# Patient Record
Sex: MALE | Race: WHITE | NOT HISPANIC OR LATINO | Employment: FULL TIME | URBAN - METROPOLITAN AREA
[De-identification: names, ages, dates, MRNs, and addresses within clinical notes are randomized per-mention and may not be internally consistent; named-entity substitution may affect disease eponyms.]

---

## 2019-02-16 ENCOUNTER — HOSPITAL ENCOUNTER (EMERGENCY)
Facility: CLINIC | Age: 19
Discharge: HOME | End: 2019-02-16
Admitting: PHYSICIAN ASSISTANT
Payer: COMMERCIAL

## 2019-02-16 VITALS
TEMPERATURE: 97.8 F | SYSTOLIC BLOOD PRESSURE: 127 MMHG | RESPIRATION RATE: 18 BRPM | BODY MASS INDEX: 24.38 KG/M2 | DIASTOLIC BLOOD PRESSURE: 78 MMHG | HEART RATE: 75 BPM | HEIGHT: 72 IN | WEIGHT: 180 LBS

## 2019-02-16 DIAGNOSIS — H10.32 ACUTE CONJUNCTIVITIS OF LEFT EYE, UNSPECIFIED ACUTE CONJUNCTIVITIS TYPE: ICD-10-CM

## 2019-02-16 DIAGNOSIS — H61.23 BILATERAL IMPACTED CERUMEN: ICD-10-CM

## 2019-02-16 DIAGNOSIS — H92.02 LEFT EAR PAIN: ICD-10-CM

## 2019-02-16 PROCEDURE — 99282 EMERGENCY DEPT VISIT SF MDM: CPT

## 2019-02-16 RX ORDER — POLYMYXIN B SULFATE AND TRIMETHOPRIM 1; 10000 MG/ML; [USP'U]/ML
1-2 SOLUTION OPHTHALMIC
Qty: 6 ML | Refills: 0 | Status: SHIPPED | OUTPATIENT
Start: 2019-02-16 | End: 2019-02-23

## 2019-02-16 ASSESSMENT — ENCOUNTER SYMPTOMS
EYE PAIN: 1
COUGH: 0
SORE THROAT: 0
EYE DISCHARGE: 1
HEADACHES: 0
EYE REDNESS: 1
RHINORRHEA: 0
FEVER: 0

## 2019-02-16 ASSESSMENT — MIFFLIN-ST. JEOR: SCORE: 1864.47

## 2019-02-16 NOTE — ED AVS SNAPSHOT
Emergency Department  [address] [place] [id number]  Phone:  [phone]  Fax:  [phone]                                    Guy Nicole   MRN: [id number]    Department:   Emergency Department   Date of Visit:  2/16/2019           After Visit Summary Signature Page    I have received my discharge instructions, and my questions have been answered. I have discussed any challenges I see with this plan with the nurse or doctor.    ..........................................................................................................................................  Patient/Patient Representative Signature      ..........................................................................................................................................  Patient Representative Print Name and Relationship to Patient    ..................................................               ................................................  Date                                   Time    ..........................................................................................................................................  Reviewed by Signature/Title    ...................................................              ..............................................  Date                                               Time          22EPIC Rev 08/18

## 2019-02-16 NOTE — ED PROVIDER NOTES
"  History     Chief Complaint:  Cold Symptoms    HPI   Guy Nicole is a 18 year old otherwise healthy male, does not wear contacts, with history of eczema, who presents with significant other for evaluation of cold symptoms, including left eye and ear pain for the last five days. Patient reports that he has been experiencing left eye pain, with noted drainage and redness. He notes that when he wakes up \"it feels like its shut\" and has been trying to irrigate it with water and using eye drops with minimal relief. He also reports some blurry vision, but notes it is not painful if he does not touch his eye. Patient also noted left ear pain as well, approximately two days after onset of left eye pain. Patient denies any fever, cough, sore throat, runny nose, headache or other physical complaints. Patient denies any known ill contacts.     Allergies:  No Known Drug Allergies      Medications:    The patient is not currently taking any prescribed medications.     Past Medical History:    The patient denies any relevant past medical history.    Past Surgical History:    History reviewed. No pertinent past surgical history.     Family History:    The patient denies any relevant family medical history.      Social History:  The patient was accompanied to the ED by significant other.  Smoking Status: N/A  Smokeless Tobacco: N/A  Alcohol Use: N/A  Drug Use: N/A   Marital Status:       Review of Systems   Constitutional: Negative for fever.   HENT: Positive for ear pain. Negative for rhinorrhea and sore throat.    Eyes: Positive for pain, discharge, redness and visual disturbance.   Respiratory: Negative for cough.    Neurological: Negative for headaches.   All other systems reviewed and are negative.    Physical Exam   Vitals:  Patient Vitals for the past 24 hrs:   BP Temp Temp src Pulse Resp Height Weight   02/16/19 1708 -- -- -- -- 18 -- --   02/16/19 1620 127/78 97.8  F (36.6  C) Oral 75 18 1.829 m (6') 81.6 kg " (180 lb)     Physical Exam  General: Well appearing, well nourished. Normal mood and affect.  Skin: Mildly erythematous dry, scaly rash throughout back of neck, face, upper extremities. Appears mildly pruritic.   HEENT: Head: Normocephalic, atraumatic, no visible masses.   Eyes: Right conjunctiva injected.  No significant edema to the upper or lower eyelid.  Mild matting in upper eyelashes with discharge.  Extraocular movements intact bilaterally. PERRL.  No periorbital edema or erythema.  Ears: Cerumen impacted in bilateral external auditory canals.  Unable to visualize TMs.  Reactive preauricular lymph node.  Nose: No external lesions, mucosa non-inflamed, septum and turbinates normal.   Throat/pharynx: Mucous membranes moist, no mucosal lesions.   Neck: Supple, without lymphadenopathy.  Cardiac: Normal rate and regular rhythm, no murmur or gallop.   Lungs: Clear to auscultation.  Abdomen: Abdomen soft, non-tender. No rebound tenderness of guarding.   Musculoskeletal: Normal gait and station.   Neurologic: Oriented x 3. GCS: 15.  Psychiatric: Intact recent and remote memory, judgment and insight, normal mood and affect.      Emergency Department Course     Emergency Department Course:  Nursing notes and vitals reviewed.    4:22 PM: I performed an exam of the patient as documented above. History obtained from patient.   4:49 PM: ER Tech reports patient's visual acuity is 20/20 bilaterally. Will discharge patient with below eye drops.  4:58 PM: Rechecked patient who endorsed significant improvement after left ear cerumen impaction. Will disimpact right ear as well and patient will be discharged home.    I discussed the treatment plan with the patient. They expressed understanding of this plan and consented to discharge. They will be discharged home with instructions for care and follow up. In addition, the patient will return to the emergency department if their symptoms persist, worsen, if new symptoms arise or if  there is any concern.  All questions were answered prior to discharge.    Impression & Plan      Medical Decision Making:  Guy Nicole is a 18 year old male who presents for evaluation of a red eye and left ear pain.  A broad differential diagnosis was considered including bacterial conjunctivitis, viral conjunctivitis, foreign body, corneal abrasion, chemical vs allergic conjunctivitis, corneal ulcer, HSV, herpes zoster opthalmicus, endopthalmitis, orbital cellulitis, etc.  Signs and symptoms consistent with a conjunctivitis, likely bacterial. Will start antibiotics and have close follow-up of eye physician.  No red flag symptoms to suggest any of the above worrisome etiologies. Visual acuity intact.  In regard to the patient's ear discomfort, there was a reactive lymph node in the preauricular region, left-sided.  Additionally, there was fairly significant cerumen impaction in bilateral external auditory canals.  This was irrigated here in the ED.  The patient then experienced significant improvement in his discomfort.  I was unable to visualize the TMs, which were within normal limits.  No signs of acute otitis media, no erythema or bulge.  I suspect that the impaction was causing the patient's discomfort.  I advised him to use cerumen softening drops at home to help avoid this in the future.  In regards to the patient's eczema, I suggested that he use thick emollients, such as Eucerin.  We also discussed possible dermatology referral.  Other symptomatic cares discussed.  Patient will return the ED for any changing worsening symptoms, worsening eye pain, visual changes, increased swelling, new concerns.  All questions were answered prior to patient's discharge.  He was in agreement with the treatment plan as stated above.    Diagnosis:    ICD-10-CM    1. Acute conjunctivitis of left eye, unspecified acute conjunctivitis type H10.32    2. Left ear pain H92.02    3. Bilateral impacted cerumen H61.23          Disposition:   Discharged.    Discharge Medications:     Medication List      Started    trimethoprim-polymyxin b 68898-3.1 UNIT/ML-% ophthalmic solution  Commonly known as:  POLYTRIM  1-2 drops, Left Eye, EVERY 3 HOURS          This was created at least in part with a voice recognition software. Mistakes/typos may be present.      Scribe Disclosure:  I, Eleonora Robertson, am serving as a scribe at 4:20 PM on 2/16/2019 to document services personally performed by Clara Hidalgo PA-C, based on my observations and the provider's statements to me.  2/16/2019    EMERGENCY DEPARTMENT       Clara Hidalgo PA  02/16/19 0424

## 2019-02-16 NOTE — DISCHARGE INSTRUCTIONS
"Use the antibacterial drops as prescribed for the next week.  Use Tylenol and ibuprofen to help with left ear pain.  Would consider following up with dermatology to help with eczema rash.  If rash is significantly itchy, consider using Benadryl.  Use thick emollients, hydrating lotion such as Eucerin or Aveeno.  Use these daily, especially after taking a bath to help with skin irritation.  Return to the ED for any changing worsening symptoms, new concerns.  Follow-up with your primary care provider within the next few days if not improving.  If you do not have a primary care provider 1 has been listed for you above.  Also  over-the-counter eardrops to help soften the wax in both of your ears.  He can use the suctioning device to help get this out of your ear canals.    Discharge Instructions  Conjunctivitis  Conjunctivitis, or \"pinkeye\", is inflammation of the conjunctiva which is the thin membrane that lines the inner surface of the eyelids and the whites of the eyes.   There are four main types of conjunctivitis: viral, bacterial, allergic, and non-specific. Both bacterial and viral conjunctivitis spread easily from one person to another by contact with the eye or another person?s hands, by an object the infected person has touched, such as a door handle, or by sharing an object that has touched their eye such as a towel or pillow case. Because of this, children with bacterial conjunctivitis can?t go back to school or  until they have been on antibiotic drops for 24 hours.  VIRAL CONJUNCTIVITIS:  This is typically caused by the virus that also causes the common cold and is often seen as part of a general cold.  This type of conjunctivitis is not treated with antibiotics, and usually lasts 3 - 5 days.  An over the counter antihistamine/decongestant eye drop may help to relieve the itching and irritation of viral conjunctivitis.  BACTERIAL CONJUNCTIVITIS:  This is treated with an antibiotic ointment or " eye drop.  In both bacterial and viral conjunctivitis, do not wear contact lenses until your eye is no longer red.   Your contact case should be thrown away and the contacts disinfected overnight, or replaced if disposable.  NON SPECIFIC CONJUNCTIVITIS: Sometimes a red eye is caused by other things such as dry eye, chemical exposure, or foreign body in the eye such as dust or eyelash.   All of these problems generally improve on their own within 24 hours.  ALLERGIC CONJUNCTIVITIS: These are eye symptoms caused by allergies. This type of conjunctivitis will be treated with allergy medications.    Return to the Emergency Department if:  If you have blurry vision.  If you have increasing eye pain or drainage.  If you have redness or swelling in the skin around the eye.  If you have a fever.    Follow-up with your doctor:    Your eye should improve within 2 days, if it does not, return to the Emergency Department or see your regular doctor for a second eye exam.  If you were given a prescription for medicine here today, be sure to read all of the information (including the package insert) that comes with your prescription.  This will include important information about the medicine, its side effects, and any warnings that you need to know about.  The pharmacist who fills the prescription can provide more information and answer questions you may have about the medicine.  If you have questions or concerns that the pharmacist cannot address, please call or return to the Emergency Department.       Opioid Medication Information    Pain medications are among the most commonly prescribed medicines, so we are including this information for all our patients. If you did not receive pain medication or get a prescription for pain medicine, you can ignore it.     You may have been given a prescription for an opioid (narcotic) pain medicine and/or have received a pain medicine while here in the Emergency Department. These medicines  can make you drowsy or impaired. You must not drive, operate dangerous equipment, or engage in any other dangerous activities while taking these medications. If you drive while taking these medications, you could be arrested for DUI, or driving under the influence. Do not drink any alcohol while you are taking these medications.     Opioid pain medications can cause addiction. If you have a history of chemical dependency of any type, you are at a higher risk of becoming addicted to pain medications.  Only take these prescribed medications to treat your pain when all other options have been tried. Take it for as short a time and as few doses as possible. Store your pain pills in a secure place, as they are frequently stolen and provide a dangerous opportunity for children or visitors in your house to start abusing these powerful medications. We will not replace any lost or stolen medicine.  As soon as your pain is better, you should flush all your remaining medication.     Many prescription pain medications contain Tylenol  (acetaminophen), including Vicodin , Tylenol #3 , Norco , Lortab , and Percocet .  You should not take any extra pills of Tylenol  if you are using these prescription medications or you can get very sick.  Do not ever take more than 3000 mg of acetaminophen in any 24 hour period.    All opioids tend to cause constipation. Drink plenty of water and eat foods that have a lot of fiber, such as fruits, vegetables, prune juice, apple juice and high fiber cereal.  Take a laxative if you don?t move your bowels at least every other day. Miralax , Milk of Magnesia, Colace , or Senna  can be used to keep you regular.      Remember that you can always come back to the Emergency Department if you are not able to see your regular doctor in the amount of time listed above, if you get any new symptoms, or if there is anything that worries you.

## 2020-11-09 ENCOUNTER — APPOINTMENT (OUTPATIENT)
Dept: CT IMAGING | Facility: CLINIC | Age: 20
End: 2020-11-09
Attending: EMERGENCY MEDICINE
Payer: COMMERCIAL

## 2020-11-09 ENCOUNTER — HOSPITAL ENCOUNTER (EMERGENCY)
Facility: CLINIC | Age: 20
Discharge: HOME | End: 2020-11-10
Attending: EMERGENCY MEDICINE
Payer: COMMERCIAL

## 2020-11-09 DIAGNOSIS — R09.1 PLEURISY: ICD-10-CM

## 2020-11-09 DIAGNOSIS — U07.1 CLINICAL DIAGNOSIS OF COVID-19: ICD-10-CM

## 2020-11-09 LAB
ALBUMIN SERPL-MCNC: 3.3 G/DL (ref 3.4–5)
ALP SERPL-CCNC: 64 U/L (ref 40–150)
ALT SERPL W P-5'-P-CCNC: 21 U/L (ref 0–70)
ANION GAP SERPL CALCULATED.3IONS-SCNC: 6 MMOL/L (ref 3–14)
AST SERPL W P-5'-P-CCNC: 21 U/L (ref 0–45)
BASOPHILS # BLD AUTO: 0 10E9/L (ref 0–0.2)
BASOPHILS NFR BLD AUTO: 0.4 %
BILIRUB SERPL-MCNC: 0.3 MG/DL (ref 0.2–1.3)
BUN SERPL-MCNC: 13 MG/DL (ref 7–30)
CALCIUM SERPL-MCNC: 8.3 MG/DL (ref 8.5–10.1)
CHLORIDE SERPL-SCNC: 105 MMOL/L (ref 94–109)
CO2 SERPL-SCNC: 30 MMOL/L (ref 20–32)
CREAT SERPL-MCNC: 1.04 MG/DL (ref 0.66–1.25)
D DIMER PPP FEU-MCNC: 0.7 UG/ML FEU (ref 0–0.5)
DIFFERENTIAL METHOD BLD: ABNORMAL
EOSINOPHIL # BLD AUTO: 0.2 10E9/L (ref 0–0.7)
EOSINOPHIL NFR BLD AUTO: 3.4 %
ERYTHROCYTE [DISTWIDTH] IN BLOOD BY AUTOMATED COUNT: 12.9 % (ref 10–15)
GFR SERPL CREATININE-BSD FRML MDRD: >90 ML/MIN/{1.73_M2}
GLUCOSE SERPL-MCNC: 92 MG/DL (ref 70–99)
HCT VFR BLD AUTO: 45.2 % (ref 40–53)
HGB BLD-MCNC: 13.5 G/DL (ref 13.3–17.7)
IMM GRANULOCYTES # BLD: 0 10E9/L (ref 0–0.4)
IMM GRANULOCYTES NFR BLD: 0.2 %
LYMPHOCYTES # BLD AUTO: 1.3 10E9/L (ref 0.8–5.3)
LYMPHOCYTES NFR BLD AUTO: 28 %
MCH RBC QN AUTO: 26.3 PG (ref 26.5–33)
MCHC RBC AUTO-ENTMCNC: 29.9 G/DL (ref 31.5–36.5)
MCV RBC AUTO: 88 FL (ref 78–100)
MONOCYTES # BLD AUTO: 0.7 10E9/L (ref 0–1.3)
MONOCYTES NFR BLD AUTO: 14.6 %
NEUTROPHILS # BLD AUTO: 2.4 10E9/L (ref 1.6–8.3)
NEUTROPHILS NFR BLD AUTO: 53.4 %
NRBC # BLD AUTO: 0 10*3/UL
NRBC BLD AUTO-RTO: 0 /100
PLATELET # BLD AUTO: 181 10E9/L (ref 150–450)
POTASSIUM SERPL-SCNC: 3.8 MMOL/L (ref 3.4–5.3)
PROT SERPL-MCNC: 7.1 G/DL (ref 6.8–8.8)
RBC # BLD AUTO: 5.14 10E12/L (ref 4.4–5.9)
SODIUM SERPL-SCNC: 141 MMOL/L (ref 133–144)
TROPONIN I SERPL-MCNC: <0.015 UG/L (ref 0–0.04)
WBC # BLD AUTO: 4.5 10E9/L (ref 4–11)

## 2020-11-09 PROCEDURE — 99285 EMERGENCY DEPT VISIT HI MDM: CPT | Mod: 25

## 2020-11-09 PROCEDURE — 85025 COMPLETE CBC W/AUTO DIFF WBC: CPT | Performed by: EMERGENCY MEDICINE

## 2020-11-09 PROCEDURE — 250N000013 HC RX MED GY IP 250 OP 250 PS 637: Performed by: EMERGENCY MEDICINE

## 2020-11-09 PROCEDURE — 250N000011 HC RX IP 250 OP 636: Performed by: EMERGENCY MEDICINE

## 2020-11-09 PROCEDURE — 71275 CT ANGIOGRAPHY CHEST: CPT

## 2020-11-09 PROCEDURE — 85379 FIBRIN DEGRADATION QUANT: CPT | Performed by: EMERGENCY MEDICINE

## 2020-11-09 PROCEDURE — 84484 ASSAY OF TROPONIN QUANT: CPT | Performed by: EMERGENCY MEDICINE

## 2020-11-09 PROCEDURE — 93005 ELECTROCARDIOGRAM TRACING: CPT

## 2020-11-09 PROCEDURE — 80053 COMPREHEN METABOLIC PANEL: CPT | Performed by: EMERGENCY MEDICINE

## 2020-11-09 RX ORDER — ASPIRIN 81 MG/1
324 TABLET, CHEWABLE ORAL ONCE
Status: COMPLETED | OUTPATIENT
Start: 2020-11-09 | End: 2020-11-09

## 2020-11-09 RX ADMIN — ASPIRIN 324 MG: 81 TABLET, CHEWABLE ORAL at 23:00

## 2020-11-09 ASSESSMENT — ENCOUNTER SYMPTOMS
SORE THROAT: 1
DIARRHEA: 1
FEVER: 1
COUGH: 1
VOMITING: 0
NAUSEA: 0
HEADACHES: 1
SHORTNESS OF BREATH: 1
PALPITATIONS: 1

## 2020-11-09 NOTE — ED AVS SNAPSHOT
[facility] Emergency Dept  [address]  [facility] [address]  Phone: [phone]  Fax: [phone]                                    Guy Nicole   MRN: [id number]    Department: [facility] Emergency Dept   Date of Visit: 11/9/2020           After Visit Summary Signature Page    I have received my discharge instructions, and my questions have been answered. I have discussed any challenges I see with this plan with the nurse or doctor.    ..........................................................................................................................................  Patient/Patient Representative Signature      ..........................................................................................................................................  Patient Representative Print Name and Relationship to Patient    ..................................................               ................................................  Date                                   Time    ..........................................................................................................................................  Reviewed by Signature/Title    ...................................................              ..............................................  Date                                               Time          22EPIC Rev 08/18

## 2020-11-09 NOTE — LETTER
November 10, 2020      To Whom It May Concern:      Guy Nicole was seen in our Emergency Department today, 11/10/20.  I expect his condition to improve over the next 3 days.  He may return to work when improved.    Sincerely,        Justin Francisco RN

## 2020-11-10 VITALS
TEMPERATURE: 98.5 F | RESPIRATION RATE: 13 BRPM | HEART RATE: 90 BPM | SYSTOLIC BLOOD PRESSURE: 120 MMHG | OXYGEN SATURATION: 99 % | BODY MASS INDEX: 24.41 KG/M2 | DIASTOLIC BLOOD PRESSURE: 78 MMHG | WEIGHT: 180 LBS

## 2020-11-10 LAB — INTERPRETATION ECG - MUSE: NORMAL

## 2020-11-10 PROCEDURE — 250N000011 HC RX IP 250 OP 636: Performed by: EMERGENCY MEDICINE

## 2020-11-10 RX ORDER — IOPAMIDOL 755 MG/ML
500 INJECTION, SOLUTION INTRAVASCULAR ONCE
Status: COMPLETED | OUTPATIENT
Start: 2020-11-10 | End: 2020-11-10

## 2020-11-10 RX ORDER — IBUPROFEN 600 MG/1
600 TABLET, FILM COATED ORAL ONCE
Status: DISCONTINUED | OUTPATIENT
Start: 2020-11-10 | End: 2020-11-10 | Stop reason: HOSPADM

## 2020-11-10 RX ADMIN — IOPAMIDOL 63 ML: 755 INJECTION, SOLUTION INTRAVENOUS at 00:11

## 2020-11-10 NOTE — ED PROVIDER NOTES
History     Chief Complaint:  Covid Concern and Chest Pain      HPI  Guy Nicole is a 20 year old male who presents to the emergency department for evaluation of COVID concern and chest pain. Patient was tested positive for COVID two days ago. He states he had symptoms including cough, fever, shortness of breath, sore throat, diarrhea, rash, and headaches. He states the past 1-2 days he has had intermittent chest pain and palpitations that have been progressively worsening. He presented to Urgent Care who immediately sent the patient here with concerns for possible PE. No nausea or vomiting.     Allergies:  No known drug allergies    Medications:    The patient is not currently taking any prescribed medications.    Past Medical History:    COVID-19  Eczema    Past Surgical History:    Tonsillectomy    Family History:    Cancer    Social History:  Smoking Status: Never Smoker  The patient presents to the emergency department by himself.  Marital Status: Single    Review of Systems   Constitutional: Positive for fever.   HENT: Positive for sore throat.    Respiratory: Positive for cough and shortness of breath.    Cardiovascular: Positive for chest pain and palpitations.   Gastrointestinal: Positive for diarrhea. Negative for nausea and vomiting.   Skin: Positive for rash.   Neurological: Positive for headaches.   All other systems reviewed and are negative.    Physical Exam     Patient Vitals for the past 24 hrs:   BP Temp Temp src Pulse Resp SpO2 Weight   11/10/20 0000 -- -- -- 85 8 98 % --   11/09/20 2345 -- -- -- 90 24 99 % --   11/09/20 2330 -- -- -- 87 11 99 % --   11/09/20 2030 128/82 98.5  F (36.9  C) Oral 93 20 100 % 81.6 kg (180 lb)         Physical Exam  Constitutional:  Oriented to person, place, and time.   HENT:   Head:    Normocephalic.   Mouth/Throat:   Oropharynx is clear and moist.   Eyes:    EOM are normal. Pupils are equal, round, and reactive to light.   Neck:    Neck supple.    Cardiovascular:  Normal rate, regular rhythm and normal heart sounds.      Exam reveals no gallop and no friction rub.       No murmur heard.  Pulmonary/Chest:  Effort normal and breath sounds normal.      No respiratory distress. No wheezes. No rales.      No reproducible chest wall pain.  Abdominal:   Soft. No distension. No tenderness. No rebound and no guarding.   Musculoskeletal:  Normal range of motion.   Neurological:   Alert and oriented to person, place, and time.           Moves all 4 extremities spontaneously    Skin:    No rash noted. No pallor.       Emergency Department Course   EKG  Indication: Chest Pain  Time: 20:42:47  Rate 82 bpm. NY interval 148. QRS duration 102. QT/QTc 364/425. P-R-T axes 38 80 48  Normal sinus rhythm. Normal ECG.   Interpreted at 2044 by Felipe Hart MD.    Imaging:  Radiographic findings were communicated with the patient who voiced understanding of the findings.  CT Chest Pulmonary embolism w/ contrast   IMPRESSION:  1.  There is no pulmonary embolus, aortic aneurysm or dissection.  2.  Multifocal bilateral pneumonia consistent with COVID-19 pneumonia.  3.  A few mildly enlarged bilateral axillary lymph nodes.   as per radiology.     Laboratory:  CBC: WBC: 4.5, HGB: 13.5, PLT: 181  CMP: Glucose 92, Calcium: 8.3 (L), Albumin: 3.3 (L), o/w WNL (Creatinine: 1.04)  Troponin (2300): <0.015  D-dimer: 0.7 (H)    Interventions:  2300 aspirin 324 mg Oral  109 ibuprofen 600 mg Oral    Emergency Department Course:  Nursing notes and vitals reviewed. (2233) I performed an exam of the patient as documented above.     Medicine administered as documented above. Blood drawn. This was sent to the lab for further testing, results above.     The patient was sent for a CT while in the emergency department, findings above.     An EKG was recorded. Results as noted above.     0040 I rechecked the patient and discussed the results of his workup thus far.     Findings and plan explained to the  Patient. Patient discharged home with instructions regarding supportive care, medications, and reasons to return. The importance of close follow-up was reviewed.       Impression & Plan    Medical Decision Making:  Guy Nicole is a 20 year old male, who was sent from Urgent Care for further workup, known COVID positive, who comes in complaining of pleuritic chest pain. Differential includes pleurisy, COVID-19, pneumothorax, pneumonia, or other causes. Workup thus far shows an elevated d-dimer, therefore I did obtain a CT of his chest which is negative for PE. It is consistent with COVID-19 pneumonia. This is clearly not bacterial and does not require antibiotics and his symptoms are likely due to pleurisy. He was told to take Tylenol and ibuprofen for pain. He is not hypoxic here and he does not require admission and is otherwise appropriate for outpatient management. He was told to return for any worsening shortness of breath, worsening symptoms, or concerns.    Critical Care time:  none    Covid-19  Guy Nicole was evaluated during a global COVID-19 pandemic, which necessitated consideration that the patient might be at risk for infection with the SARS-CoV-2 virus that causes COVID-19.   Applicable protocols for evaluation were followed during the patient's care.   COVID-19 was considered as part of the patient's evaluation. The plan for testing is:  a test was obtained at a previous visit and reviewed & considered today.      Diagnosis:    ICD-10-CM    1. Clinical diagnosis of COVID-19  U07.1    2. Pleurisy  R09.1        Disposition:  Discharged to home    Freedom Welsh  11/9/2020   EMERGENCY DEPARTMENT  Scribe Disclosure:  I, Freedom Welsh, am serving as a scribe at 10:33 PM on 11/9/2020 to document services personally performed by Felipe Hart MD based on my observations and the provider's statements to me.          Felipe Hart MD  11/10/20 1782

## 2020-11-10 NOTE — ED TRIAGE NOTES
Pt arrives with complaints of increasing chest pain in the presence of COVID, which he tested positive for on Saturday. Pt reports he has every COVID Sx besides nausea and vomiting, including rash, SOB, cough, fevers, and headaches. ABCs intact, A/O x4. Sent from clinic, where they did nothing, for d-dimer.

## 2021-11-01 ENCOUNTER — OFFICE VISIT (OUTPATIENT)
Dept: URGENT CARE | Facility: URGENT CARE | Age: 21
End: 2021-11-01
Payer: COMMERCIAL

## 2021-11-01 VITALS
BODY MASS INDEX: 24.14 KG/M2 | WEIGHT: 178 LBS | SYSTOLIC BLOOD PRESSURE: 158 MMHG | OXYGEN SATURATION: 100 % | HEART RATE: 115 BPM | RESPIRATION RATE: 16 BRPM | DIASTOLIC BLOOD PRESSURE: 60 MMHG | TEMPERATURE: 98.5 F

## 2021-11-01 DIAGNOSIS — R21 PENILE RASH: Primary | ICD-10-CM

## 2021-11-01 DIAGNOSIS — Z86.19 HISTORY OF POSITIVE PCR FOR HERPES SIMPLEX VIRUS TYPE 2 (HSV-2) DNA: ICD-10-CM

## 2021-11-01 PROCEDURE — 99203 OFFICE O/P NEW LOW 30 MIN: CPT | Performed by: FAMILY MEDICINE

## 2021-11-01 RX ORDER — SWAB
SWAB, NON-MEDICATED MISCELLANEOUS
COMMUNITY
Start: 2021-02-26

## 2021-11-01 RX ORDER — FLUTICASONE PROPIONATE 50 MCG
2 SPRAY, SUSPENSION (ML) NASAL
COMMUNITY
Start: 2021-07-19 | End: 2022-01-14

## 2021-11-01 RX ORDER — VALACYCLOVIR HYDROCHLORIDE 500 MG/1
500 TABLET, FILM COATED ORAL 2 TIMES DAILY
Qty: 6 TABLET | Refills: 0 | Status: SHIPPED | OUTPATIENT
Start: 2021-11-01 | End: 2022-09-19

## 2021-11-01 RX ORDER — PREDNISONE 20 MG/1
20 TABLET ORAL DAILY
COMMUNITY
Start: 2021-07-19 | End: 2022-01-14

## 2021-11-01 RX ORDER — TRIAMCINOLONE ACETONIDE 1 MG/G
CREAM TOPICAL
COMMUNITY
Start: 2021-02-26 | End: 2022-01-14

## 2021-11-01 RX ORDER — HYDROXYZINE HYDROCHLORIDE 50 MG/1
TABLET, FILM COATED ORAL
COMMUNITY
Start: 2021-02-26 | End: 2022-01-14

## 2021-11-01 NOTE — PATIENT INSTRUCTIONS
Patient Education     Genital Herpes    Genital herpes is a common sexually transmitted infection (STI). It is caused by the herpes simplex virus (HSV). One out of 5 teens and adults carry the herpes virus. During an outbreak, it causes small blisters that break open, leaving small, painful sores in the genital area. Eventually, scabs form and the sores heal. In women, these show up most often on the skin just outside the vaginal opening. They can occur on the buttocks, anus, or cervix. In men, the sores are usually on the tip, sides, or base of the penis. They also occur on the scrotum, buttocks, or thighs.  The first outbreak begins within 2 to 3 weeks after exposure to an infected sexual partner. It may last 1 to 3 weeks. It may cause headache, muscle ache, and fevers. The first outbreak is usually the worst. Because the virus remains in the body even after the sores heal, most people will have more outbreaks. The frequency of outbreaks is different for each person. Some people will never have another outbreak. Others will have several episodes a year. Later outbreaks are usually shorter, milder, and less painful. For many, the number of outbreaks tends to decrease over time. Various factors may trigger an outbreak. These include:    Emotional stress    Menstruation    Presence of another illness (cold, flu, or fever from any cause)    Overexertion and fatigue    Weak immune system  Home care    It is very important that you do not have sexual relations until all the herpes sores have healed completely.    Wash the affected area gently with mild soap and water. Wash your hands after touching the affected area.    You may use over-the-counter pain medicine unless another pain medicine was prescribed. If you have chronic liver or kidney disease or have ever had a stomach ulcer or gastrointestinal bleeding, talk with your healthcare provider before using these medicines. Also talk to your provider if you are  taking medicine to prevent blood clots. Aspirin should never be given to anyone younger than 18 years of age who is ill with a viral infection or fever. It may cause severe liver or brain damage.    Your healthcare provider may prescribe antiviral medicine during the first outbreak. This will help the sores heal faster. Antiviral medicine may also be prescribed so that you have it ready to take at the first sign of another outbreak. This will help the symptoms go away sooner. For people with frequent outbreaks, daily preventive therapy may be prescribed. This will help reduce the frequency of attacks. Daily preventive therapy may also reduce risk of spread of herpes to your sexual partner. Discuss the risks and benefits of daily therapy with your healthcare provider.    If you are a woman who is pregnant now or may become pregnant in the future, let your healthcare provider know that you have had herpes. This may affect the way your baby is delivered.  Preventing spread to others  The virus is spread by sexual contact with someone who has the herpes virus. The risk of spread is highest when the sores are present. However, there is a chance of spreading the virus even when sores are not visible. Inform future sexual partners that you have herpes and that they may become infected. To reduce the risk of passing the virus to a partner who has never had herpes, avoid sexual relations at the first sign of an outbreak and until the sores are fully healed. Latex barriers, such as condoms, reduce the risk of spread between outbreaks if the infected site is covered, but they do not guarantee protection.  Follow-up care  Follow up with your healthcare provider, or as advised.  People who have just learned that they have herpes may feel upset. Getting the facts about herpes can help you feel more in control. Follow up with your healthcare provider or the public health department for complete STI screening, including HIV  testing.  When to seek medical advice  Call your healthcare provider right away if any of these occur:    Inability to urinate due to pain    Swelling or increasing redness in the genital area    Discharge from the vagina or penis    Increasing back or abdominal pain    Rash or joint pain  Call 911  Call 911 or get immediate medical care if any of these occur:    Unusual drowsiness, weakness, or confusion    Worsening headache or stiff neck  Janae last reviewed this educational content on 6/1/2018 2000-2021 [employer]. All rights reserved. This information is not intended as a substitute for professional medical care. Always follow your healthcare professional's instructions.

## 2021-11-01 NOTE — PROGRESS NOTES
SUBJECTIVE: Guy Nicole is a 21 year old male presenting with a chief complaint of penile rash.  Onset of symptoms was day(s) ago.  Course of illness is same.    Current and Associated symptoms: none  Predisposing factors include HX of HSV type 2.    No past medical history on file.  No Known Allergies  Social History     Tobacco Use     Smoking status: Never Smoker     Smokeless tobacco: Never Used   Substance Use Topics     Alcohol use: Not on file       ROS:  SKIN: no rash  GI: no vomiting    OBJECTIVE:  BP (!) 158/60   Pulse 115   Temp 98.5  F (36.9  C) (Tympanic)   Resp 16   Wt 80.7 kg (178 lb)   SpO2 100%   BMI 24.14 kg/m  GENERAL APPEARANCE: healthy, alert and no distress  SKIN: penile blisters      ICD-10-CM    1. Penile rash  R21 valACYclovir (VALTREX) 500 MG tablet   2. History of positive PCR for herpes simplex virus type 2 (HSV-2) DNA  Z86.19        Fluids/Rest, f/u if worse/not any better

## 2022-01-14 ENCOUNTER — OFFICE VISIT (OUTPATIENT)
Dept: URGENT CARE | Facility: URGENT CARE | Age: 22
End: 2022-01-14

## 2022-01-14 VITALS
OXYGEN SATURATION: 100 % | TEMPERATURE: 97.4 F | DIASTOLIC BLOOD PRESSURE: 68 MMHG | SYSTOLIC BLOOD PRESSURE: 123 MMHG | HEART RATE: 68 BPM

## 2022-01-14 DIAGNOSIS — R21 RASH: ICD-10-CM

## 2022-01-14 DIAGNOSIS — L20.9 ATOPIC DERMATITIS, UNSPECIFIED TYPE: Primary | ICD-10-CM

## 2022-01-14 DIAGNOSIS — L29.9 PRURITIC DISORDER: ICD-10-CM

## 2022-01-14 PROCEDURE — 99214 OFFICE O/P EST MOD 30 MIN: CPT | Performed by: PHYSICIAN ASSISTANT

## 2022-01-14 RX ORDER — TRIAMCINOLONE ACETONIDE 5 MG/G
CREAM TOPICAL 2 TIMES DAILY
Qty: 60 G | Refills: 0 | Status: SHIPPED | OUTPATIENT
Start: 2022-01-14

## 2022-01-14 RX ORDER — PREDNISONE 20 MG/1
TABLET ORAL
Qty: 19 TABLET | Refills: 0 | Status: SHIPPED | OUTPATIENT
Start: 2022-01-14 | End: 2022-09-19

## 2022-01-14 RX ORDER — HYDROXYZINE HYDROCHLORIDE 25 MG/1
25-50 TABLET, FILM COATED ORAL
Qty: 60 TABLET | Refills: 1 | Status: SHIPPED | OUTPATIENT
Start: 2022-01-14

## 2022-01-14 RX ORDER — CETIRIZINE HYDROCHLORIDE 10 MG/1
10 TABLET ORAL DAILY
Qty: 90 TABLET | Refills: 0 | Status: SHIPPED | OUTPATIENT
Start: 2022-01-14

## 2022-01-14 NOTE — PROGRESS NOTES
Assessment & Plan     Atopic dermatitis, unspecified type  Severity of atopic dermatitis is mod/severe  Course of prednisone  Start on cerave cream  Use triamcinolone for worst areas, spot treatmentes  - predniSONE (DELTASONE) 20 MG tablet; 1 tab po tid for 3 days, then 1 tab po bid for 3 days, then 1 tab po every day for 2 days, then 1/2 tab po every day for 4 days  - Emollient (CERAVE) CREA; Externally apply topically daily  - triamcinolone (ARISTOCORT HP) 0.5 % external cream; Apply topically 2 times daily  - Adult Dermatology Referral; Future    Rash  Secondary to severe eczema  Referral to Dermatology  - predniSONE (DELTASONE) 20 MG tablet; 1 tab po tid for 3 days, then 1 tab po bid for 3 days, then 1 tab po every day for 2 days, then 1/2 tab po every day for 4 days  - hydrOXYzine (ATARAX) 25 MG tablet; Take 1-2 tablets (25-50 mg) by mouth nightly as needed for itching  - cetirizine (ZYRTEC) 10 MG tablet; Take 1 tablet (10 mg) by mouth daily  - Adult Dermatology Referral; Future    Pruritic disorder  Prednisone  Atarax for itching  Zyrtec for itching  - predniSONE (DELTASONE) 20 MG tablet; 1 tab po tid for 3 days, then 1 tab po bid for 3 days, then 1 tab po every day for 2 days, then 1/2 tab po every day for 4 days  - hydrOXYzine (ATARAX) 25 MG tablet; Take 1-2 tablets (25-50 mg) by mouth nightly as needed for itching  - cetirizine (ZYRTEC) 10 MG tablet; Take 1 tablet (10 mg) by mouth daily  - Adult Dermatology Referral; Future    Review of external notes as documented elsewhere in note  30 minutes spent on the date of the encounter doing chart review, review of outside records, review of test results, interpretation of tests, patient visit and documentation     CONSULTATION/REFERRAL to Dermatology    No follow-ups on file.    Júnior Padilla PA-C  [facility] NIKI Tovar is a 21 year old who presents for the following health issues  accompanied by his spouse.    HPI      Severe rash  Itching  Unable to sleep    Review of Systems   Constitutional, HEENT, cardiovascular, pulmonary, gi and gu systems are negative, except as otherwise noted.      Objective    /68   Pulse 68   Temp 97.4  F (36.3  C) (Tympanic)   SpO2 100%   There is no height or weight on file to calculate BMI.  Physical Exam   GENERAL: healthy, alert and no distress  MS: no gross musculoskeletal defects noted, no edema  SKIN: Positive for generalized itching, rash, eczema like rash, worst in areas   NEURO: Normal strength and tone, mentation intact and speech normal

## 2022-09-19 ENCOUNTER — HOSPITAL ENCOUNTER (EMERGENCY)
Facility: CLINIC | Age: 22
Discharge: HOME | End: 2022-09-19
Attending: EMERGENCY MEDICINE | Admitting: EMERGENCY MEDICINE
Payer: OTHER MISCELLANEOUS

## 2022-09-19 VITALS
DIASTOLIC BLOOD PRESSURE: 84 MMHG | HEART RATE: 70 BPM | SYSTOLIC BLOOD PRESSURE: 126 MMHG | RESPIRATION RATE: 20 BRPM | OXYGEN SATURATION: 100 % | TEMPERATURE: 98 F

## 2022-09-19 DIAGNOSIS — S61.212A LACERATION OF RIGHT MIDDLE FINGER WITHOUT FOREIGN BODY WITHOUT DAMAGE TO NAIL, INITIAL ENCOUNTER: ICD-10-CM

## 2022-09-19 PROCEDURE — 99282 EMERGENCY DEPT VISIT SF MDM: CPT

## 2022-09-20 NOTE — ED PROVIDER NOTES
Visit Date: 09/19/2022    CHIEF COMPLAINT:  Finger laceration.    HISTORY OF PRESENT ILLNESS:  This is a 22-year-old gentleman who presents with a laceration to his left third finger.  He sustained this at work.  He was cutting something and a strap hit his finger causing a bleeding wound that he has not been able to get to stop bleeding.  His last tetanus shot was 2 years ago.  No other complaints at this time.    PAST MEDICAL HISTORY:  Eczema.    PAST SURGICAL HISTORY:  Tonsillectomy.    MEDICATIONS:    1.  Zyrtec.  2.  Emollient cream.  3.  Atarax.  4.  Triamcinolone cream.  5.  Valtrex.  6.  Omega 3 fatty acids.    SOCIAL HISTORY:  The patient presents with a coworker.  He does not smoke.    REVIEW OF SYSTEMS:  Pertinent 10-point review of systems is negative except for that noted in the HPI.    PHYSICAL EXAMINATION:    GENERAL:  The patient is sitting up comfortably in a chair in triage.  VITAL SIGNS:  Blood pressure 126/84, heart rate 70, respiratory rate 20, oxygen 100% on room air, temperature 98 degrees.  CARDIOVASCULAR:  Normal perfusion in the left third finger.  RESPIRATORY:  Normal effort.  MUSCULOSKELETAL:  Full range of motion of the left second finger.  No deformity.  He is able to hold the finger at extension against resistance.  SKIN:  There is an avulsion laceration to the dorsum of the middle phalanx of the left third finger.  Loss of soft tissue noted.  Total length approximately 4 mm x 2 mm.  PSYCHIATRIC:  The patient has normal mood and affect.  NEUROLOGIC:  The patient is alert and has normal strength and sensation in the left third finger.    LABORATORY DATA AND DIAGNOSTIC STUDIES:  None.    EMERGENCY DEPARTMENT COURSE AND MEDICAL DECISION MAKING:  This is a 22-year-old gentleman who presents with an avulsion type laceration to the dorsum of his left third finger.  This is not amenable to suture repair.  It was cleaned thoroughly with Shur-Clens.  I then placed Surgicel on top to control the  bleeding, covered with sterile dressings and placed in a finger splint for protection.  He will follow up with his regular physician as needed.  Tetanus was confirmed to be up-to-date.    DIAGNOSES:  Avulsion laceration of the left third finger.    PLAN OF CARE:  As above.    Héctor Aguillon MD        D: 2022   T: 2022   MT: ANAYA    Name:     CONSTANZA NIELSON  MRN:      [id number]        Account:    [id number]   :      2000           Visit Date: 2022     Document: [id number]

## 2022-09-20 NOTE — ED TRIAGE NOTES
Pt arrives to ED with wound to L middle finger. Pt was cutting something at work and a strap hit his finger and caused a bleeding wound. Bleeding controlled in triage, no thinners.

## 2023-10-03 ENCOUNTER — OFFICE VISIT (OUTPATIENT)
Dept: FAMILY MEDICINE | Facility: CLINIC | Age: 23
End: 2023-10-03
Payer: COMMERCIAL

## 2023-10-03 VITALS
SYSTOLIC BLOOD PRESSURE: 135 MMHG | HEART RATE: 61 BPM | BODY MASS INDEX: 23.59 KG/M2 | DIASTOLIC BLOOD PRESSURE: 76 MMHG | OXYGEN SATURATION: 100 % | RESPIRATION RATE: 16 BRPM | WEIGHT: 178 LBS | HEIGHT: 73 IN | TEMPERATURE: 97.9 F

## 2023-10-03 DIAGNOSIS — Z11.4 SCREENING FOR HIV (HUMAN IMMUNODEFICIENCY VIRUS): ICD-10-CM

## 2023-10-03 DIAGNOSIS — B00.9 HSV (HERPES SIMPLEX VIRUS) INFECTION: ICD-10-CM

## 2023-10-03 DIAGNOSIS — Z00.00 ROUTINE GENERAL MEDICAL EXAMINATION AT A HEALTH CARE FACILITY: Primary | ICD-10-CM

## 2023-10-03 DIAGNOSIS — Z11.59 NEED FOR HEPATITIS C SCREENING TEST: ICD-10-CM

## 2023-10-03 DIAGNOSIS — Z13.228 SCREENING FOR METABOLIC DISORDER: ICD-10-CM

## 2023-10-03 LAB
HCV AB SERPL QL IA: NONREACTIVE
HIV 1+2 AB+HIV1 P24 AG SERPL QL IA: NONREACTIVE

## 2023-10-03 PROCEDURE — 36415 COLL VENOUS BLD VENIPUNCTURE: CPT | Performed by: FAMILY MEDICINE

## 2023-10-03 PROCEDURE — 86803 HEPATITIS C AB TEST: CPT | Performed by: FAMILY MEDICINE

## 2023-10-03 PROCEDURE — 99395 PREV VISIT EST AGE 18-39: CPT | Performed by: FAMILY MEDICINE

## 2023-10-03 PROCEDURE — 87389 HIV-1 AG W/HIV-1&-2 AB AG IA: CPT | Performed by: FAMILY MEDICINE

## 2023-10-03 PROCEDURE — 80048 BASIC METABOLIC PNL TOTAL CA: CPT | Performed by: FAMILY MEDICINE

## 2023-10-03 RX ORDER — VALACYCLOVIR HYDROCHLORIDE 500 MG/1
500 TABLET, FILM COATED ORAL DAILY
COMMUNITY
Start: 2023-05-18 | End: 2023-10-03

## 2023-10-03 RX ORDER — VALACYCLOVIR HYDROCHLORIDE 500 MG/1
500 TABLET, FILM COATED ORAL DAILY
Qty: 90 TABLET | Refills: 3 | Status: SHIPPED | OUTPATIENT
Start: 2023-10-03

## 2023-10-03 SDOH — HEALTH STABILITY: PHYSICAL HEALTH: ON AVERAGE, HOW MANY DAYS PER WEEK DO YOU ENGAGE IN MODERATE TO STRENUOUS EXERCISE (LIKE A BRISK WALK)?: 1 DAY

## 2023-10-03 ASSESSMENT — LIFESTYLE VARIABLES
HOW OFTEN DO YOU HAVE SIX OR MORE DRINKS ON ONE OCCASION: NEVER
HOW OFTEN DO YOU HAVE A DRINK CONTAINING ALCOHOL: NEVER
SKIP TO QUESTIONS 9-10: 1
HOW MANY STANDARD DRINKS CONTAINING ALCOHOL DO YOU HAVE ON A TYPICAL DAY: PATIENT DOES NOT DRINK
AUDIT-C TOTAL SCORE: 0

## 2023-10-03 ASSESSMENT — ENCOUNTER SYMPTOMS
CONSTIPATION: 0
FREQUENCY: 0
ARTHRALGIAS: 0
HEADACHES: 0
HEARTBURN: 0
SORE THROAT: 0
NAUSEA: 0
ABDOMINAL PAIN: 0
NERVOUS/ANXIOUS: 0
DIZZINESS: 1
DYSURIA: 0
FEVER: 0
MYALGIAS: 0
DIARRHEA: 0
PARESTHESIAS: 0
HEMATURIA: 0
HEMATOCHEZIA: 0
CHILLS: 0
EYE PAIN: 0
WEAKNESS: 0
COUGH: 0
SHORTNESS OF BREATH: 0
PALPITATIONS: 0
JOINT SWELLING: 0

## 2023-10-03 ASSESSMENT — SOCIAL DETERMINANTS OF HEALTH (SDOH)
DO YOU BELONG TO ANY CLUBS OR ORGANIZATIONS SUCH AS CHURCH GROUPS UNIONS, FRATERNAL OR ATHLETIC GROUPS, OR SCHOOL GROUPS?: NO
HOW OFTEN DO YOU GET TOGETHER WITH FRIENDS OR RELATIVES?: ONCE A WEEK
HOW OFTEN DO YOU ATTENT MEETINGS OF THE CLUB OR ORGANIZATION YOU BELONG TO?: NEVER
HOW OFTEN DO YOU ATTEND CHURCH OR RELIGIOUS SERVICES?: 1 TO 4 TIMES PER YEAR
ARE YOU MARRIED, WIDOWED, DIVORCED, SEPARATED, NEVER MARRIED, OR LIVING WITH A PARTNER?: LIVING WITH PARTNER
IN A TYPICAL WEEK, HOW MANY TIMES DO YOU TALK ON THE PHONE WITH FAMILY, FRIENDS, OR NEIGHBORS?: TWICE A WEEK

## 2023-10-03 NOTE — COMMUNITY RESOURCES LIST (ENGLISH)
10/03/2023   [facility]  N/A  For questions about this resource list or additional care needs, please contact your primary care clinic or care manager.  Phone: [phone]   Email: N/A   Address: [address] [place] [id number]   Hours: N/A        Food and Nutrition       Food pantry  1  [facility] - Fare for All Distance: 2.02 miles      Pickup   [address] [place] [address]  Language: English, Turkish  Hours: Fri 10:00 AM - 1:00 PM  Fees: Self Pay   Phone: (431) 814-9406 Email: [facility] Website: https://www.[facility]/simseeaph-adydkpdxi-hnoers     2  [facility] Distance: 2.89 miles      In-Person, Pickup   [address] [place] [address]  Language: English, Turkish  Hours: Mon - Wed 9:00 AM - 4:30 PM Appt. Only, Thu 9:00 AM - 6:30 PM Appt. Only, Fri 9:00 AM - 4:30 PM Appt. Only  Fees: Free   Phone: (499) 121-9251 Ext.100 Email: info@[employer].org Website: https://eSilicon.org     SNAP application assistance  3  [facility] Distance: 2.89 miles      In-Person, Phone/Virtual   [address] [place] [address]  Language: English, Turkish  Hours: Mon - Fri 9:00 AM - 4:30 PM  Fees: Free   Phone: (990) 124-9229 Ext.113 Email: info@[employer].org Website: https://[employer]     4  [employer] Distance: 9.66 miles      In-Person   [address] [place] [address]  Language: English, Turkish  Hours: Mon - Fri 8:30 AM - 5:00 PM  Fees: Free   Phone: (456) 861-2650 Email: info@clues.org Website: http://www.clues.org/     Soup kitchen or free meals  5  [facility] Distance: 3.08 miles      Pickup   [address] [facility] [place] [address]  Language: English  Hours: Mon - Fri 5:00 PM - 6:00 PM  Fees: Free   Phone: (568) 994-5092 Email: contactus@[employer].org Website: https://www.[employer].org/     6   [facility] [employer] Distance: 3.69 miles      Pickup   [address] [place] [address]  Language: English  Hours: Sat 5:00 PM - 7:00 PM , Sun 5:30 PM - 6:30 PM  Fees: Free   Phone: (140) 371-3849 Email: office@[facility] Website: http://[facility] InsaneVason[facility]/          Hotlines and Helplines       Hotline - Housing crisis  7  [facility] (Main Office) - Emergency Services Distance: 5 miles      Phone/Virtual   [address] [place] [address]  Language: English  Hours: Mon - Sun Open 24 Hours   Phone: (186) 937-5506 Email: info@[employer] Website: http://[employer]     8  [facility] Distance: 9.74 miles      Phone/Virtual   [address] [place] [address]  Language: English  Hours: Mon - Sun Open 24 Hours   Phone: (623) 546-1091 Email: info@[employer] Website: http://www.[employer].org          Housing       Coordinated Entry access point  9  [facility] - Housing Services Distance: 10.25 miles      In-Person   [address] [place] [address]  Language: English  Hours: Mon - Fri 9:00 AM - 5:00 PM  Fees: Free   Phone: (540) 640-5628 Email: housing@[facility].org Website: http://www.[facility].org/housing     10  [facility] - Adult assisted [facility] Distance: 11.18 miles      In-Person   [address] [place] [address]  Language: English  Hours: Mon - Sat 10:00 PM - 5:00 PM  Fees: Free   Phone: (661) 324-1157 Email: info@[employer]ola.[employer] Website: http://www.saintolaf.org/     Drop-in center or day shelter  11  [facility] Distance: 10.58 miles      In-Person   [address] [place] [address]  Language: English  Hours: Mon - Fri 12:00 PM - 3:00 PM  Fees: Free   Phone: (487) 220-7969 Email: [employer] Website: http://[employer].org/     12  [facility]  Distance: 10.8 miles      In-Person   [address] [place] [address]  Language: English, Nepalese, Burundian  Hours: Mon - Sat 7:00 AM - 3:00 PM  Fees: Free, Self Pay   Phone: (832) 972-1485 Email: info@[employer] Website: https://www.[employer]/locations/opportunity-center/     Housing search assistance  13  [facility] [employer] [facility] [employer] [facility] - Rental Homes for Future Homebuyers Program Distance: 2.29 miles      Phone/Virtual   [address] [place] [address]  Language: English  Hours: Mon - Fri 8:00 AM - 4:30 PM  Fees: Free   Phone: (285) 119-9563 Email: hra@[facility] Website: https://www.[facility]/hra/[place]an[place]-housing-and-ltpeovqjywfhn-vwwqcmrbw-hpt     14  [employer] Distance: 9.53 miles      In-Person   [address] [facility] [address] [place] [address]  Language: English  Hours: Mon - Fri 8:00 AM - 5:00 PM  Fees: Insurance, Self Pay   Phone: (866) 902-7757 Email: Keyanna@[employer] Website: http://www.[employer]/     Shelter for individuals  15  [facility] Distance: 10.42 miles      In-Person   [address] [place] [address]  Language: English  Hours: Mon - Sun Open 24 Hours  Fees: Free   Phone: (361) 372-8109 Email: communications@[facility].org Website: https://[facility]-[place].org/oursaviourshousing/     16  [facility] [place] [facility] [place] Distance: 11.24 miles      In-Person   [address] [place] [address]  Language: English  Hours: Mon - Sun 5:00 PM - 10:00 AM  Fees: Free, Self Pay   Phone: (376) 779-1057 Email: info@[employer] Website: https://www.[employer].lifeIO/locations/higher-ground-shelter/          Important Numbers & Websites       Emergency Services   [address]  [place] [address]  Poison Control   (433) 521-8837  Suicide Prevention Lifeline   (985) 640-9052 (TALK)  Child Abuse Hotline   (100) 962-7355 (4-A-Child)  Sexual Assault Hotline   (758) 282-5805  (HOPE)  National Runaway Safeline   (341) 969-2512 (RUNAWAY)  All-Options Talkline   (804) 640-3051  Substance Abuse Referral   (636) 181-7950 (HELP)

## 2023-10-03 NOTE — PROGRESS NOTES
SUBJECTIVE:   CC: Guy is an 23 year old who presents for preventative health visit.       10/3/2023     9:24 AM   Additional Questions   Roomed by Yanira Bryan     Discussed healthy eating .   Discussed maintaining ideal weight       Healthy Habits:     Getting at least 3 servings of Calcium per day:  Yes    Bi-annual eye exam:  NO    Dental care twice a year:  NO    Sleep apnea or symptoms of sleep apnea:  None    Diet:  Regular (no restrictions)    Frequency of exercise:  1 day/week    Duration of exercise:  15-30 minutes    Taking medications regularly:  Yes    Medication side effects:  Lightheadedness    Additional concerns today:  No      Today's PHQ-2 Score:       10/3/2023     9:14 AM   PHQ-2 ( 1999 [employer])   Q1: Little interest or pleasure in doing things 0   Q2: Feeling down, depressed or hopeless 0   PHQ-2 Score 0   Q1: Little interest or pleasure in doing things Not at all   Q2: Feeling down, depressed or hopeless Not at all   PHQ-2 Score 0           : No, advance care planning information given to patient to review.  Patient plans to discuss their wishes with loved ones or provider.      Social History     Tobacco Use    Smoking status: Never     Passive exposure: Never    Smokeless tobacco: Never   Substance Use Topics    Alcohol use: Not on file             10/3/2023     9:13 AM   Alcohol Use   Prescreen: >3 drinks/day or >7 drinks/week? No       Last PSA: No results found for: PSA    Reviewed orders with patient. Reviewed health maintenance and updated orders accordingly - Yes    Reviewed and updated as needed this visit by clinical staff   Tobacco  Allergies  Meds              Reviewed and updated as needed this visit by Provider                 Past Medical History:   Diagnosis Date    Eczema       Past Surgical History:   Procedure Laterality Date    TONSILLECTOMY         Review of Systems   Constitutional:  Negative for chills and fever.   HENT:  Negative for congestion, ear pain,  "hearing loss and sore throat.    Eyes:  Negative for pain and visual disturbance.   Respiratory:  Negative for cough and shortness of breath.    Cardiovascular:  Negative for chest pain, palpitations and peripheral edema.   Gastrointestinal:  Negative for abdominal pain, constipation, diarrhea, heartburn, hematochezia and nausea.   Genitourinary:  Positive for genital sores. Negative for dysuria, frequency, hematuria, impotence, penile discharge and urgency.   Musculoskeletal:  Negative for arthralgias, joint swelling and myalgias.   Skin:  Positive for rash.   Neurological:  Positive for dizziness. Negative for weakness, headaches and paresthesias.   Psychiatric/Behavioral:  Negative for mood changes. The patient is not nervous/anxious.        OBJECTIVE:   /76 (BP Location: Right arm, Patient Position: Chair, Cuff Size: Adult Large)   Pulse 61   Temp 97.9  F (36.6  C) (Oral)   Resp 16   Ht 1.854 m (6' 1\")   Wt 80.7 kg (178 lb)   SpO2 100%   BMI 23.48 kg/m      Physical Exam  GENERAL: healthy, alert and no distress  EYES: Eyes grossly normal to inspection, PERRL and conjunctivae and sclerae normal  HENT: ear canals and TM's normal, nose and mouth without ulcers or lesions  NECK: no adenopathy, no asymmetry, masses, or scars and thyroid normal to palpation  RESP: lungs clear to auscultation - no rales, rhonchi or wheezes  CV: regular rate and rhythm, normal S1 S2, no S3 or S4, no murmur, click or rub, no peripheral edema and peripheral pulses strong  ABDOMEN: soft, nontender, no hepatosplenomegaly, no masses and bowel sounds normal  MS: no gross musculoskeletal defects noted, no edema  SKIN: no suspicious lesions or rashes  NEURO: Normal strength and tone, mentation intact and speech normal  PSYCH: mentation appears normal, affect normal/bright    Diagnostic Test Results:  Labs reviewed in Epic    ASSESSMENT/PLAN:   (Z00.00) Routine general medical examination at a health care facility  (primary " encounter diagnosis)  Comment: Discussed healthy eating   Discussed maintaining ideal weight   Discussed regular exercise .    (Z11.4) Screening for HIV (human immunodeficiency virus)  Comment:   Plan: HIV Antigen Antibody Combo            (Z11.59) Need for hepatitis C screening test  Comment:   Plan: Hepatitis C Screen Reflex to HCV RNA Quant and         Genotype            (B00.9) HSV (herpes simplex virus) infection  Comment:   Plan: valACYclovir (VALTREX) 500 MG tablet            (Z13.228) Screening for metabolic disorder  Comment:   Plan: Basic metabolic panel  (Ca, Cl, CO2, Creat,         Gluc, K, Na, BUN)              COUNSELING:   Reviewed preventive health counseling, as reflected in patient instructions       Regular exercise       Healthy diet/nutrition       Vision screening       Hearing screening        He reports that he has never smoked. He has never been exposed to tobacco smoke. He has never used smokeless tobacco.            Alexandra Valentine MD  [facility]

## 2023-10-03 NOTE — COMMUNITY RESOURCES LIST (ENGLISH)
10/03/2023   [facility]  N/A  For additional resource needs, please contact your health insurance member services or your primary care team.  Phone: [phone]   Email: N/A   Address: [address] [place] [address]   Hours: N/A        Food and Nutrition       Food pantry  1  [facility] - Fare for All Distance: 2.02 miles      Pickup   [address] [place] [address]  Language: English, Thai  Hours: Fri 10:00 AM - 1:00 PM  Fees: Self Pay   Phone: (948) 292-8107 Email: [facility] Website: https://www.[facility]/[email or web address]     2  [facility] Distance: 2.89 miles      In-Person, Pickup   [address] [place] [address]  Language: English, Thai  Hours: Mon - Wed 9:00 AM - 4:30 PM Appt. Only, Thu 9:00 AM - 6:30 PM Appt. Only, Fri 9:00 AM - 4:30 PM Appt. Only  Fees: Free   Phone: (490) 371-1707 Ext.100 Email: info@[employer]oreo.com.org Website: https://CentralMayoreo.com.org     SNAP application assistance  3  [employer] [place] Distance: 15.22 miles      Phone/Virtual   [address] [facility] [address] [place] [address]  Language: English, Hmong, Puerto Rican, Nicaraguan, Thai  Hours: Mon - Fri 8:30 AM - 4:30 PM  Fees: Free   Phone: (757) 787-8032 Email: helpline@hungers[employer].org Website: https://www.hungersolutions.org/programs/[place]-food-helpline/     4  [facility] Distance: 2.89 miles      In-Person, Phone/Virtual   [address] [place] [address]  Language: English, Thai  Hours: Mon - Fri 9:00 AM - 4:30 PM  Fees: Free   Phone: (265) 900-1605 Ext.113 Email: info@[employer].com.org Website: https://CentralMayoreo.com.org     Soup kitchen or free meals  5  [facility] [place] Distance: 3.08 miles      Pickup   [address] [place] [address]  Language: English  Hours: Mon - Fri 5:00 PM - 6:00 PM  Fees: Free   Phone: (196) 916-1648 Email:  contactus@[employer].org Website: https://www.Songza.org/     6  [facility] [place] Distance: 3.69 miles      Pickup   [address] [place] [address]  Language: English  Hours: Sat 5:00 PM - 7:00 PM , Sun 5:30 PM - 6:30 PM  Fees: Free   Phone: (640) 346-5943 Email: office@[facility] Website: http://CriticMania.comPaintsville ARH HospitalSmartisan/          Hotlines and Helplines       Hotline - Housing crisis  7  [facility] (Main Office) - Emergency Services Distance: 5 miles      Phone/Virtual   [address] [place] [address]  Language: English  Hours: Mon - Sun Open 24 Hours   Phone: (945) 214-4237 Email: info@[employer] Website: http://UrbanTakeover.Wild Pockets     8  [facility] Distance: 9.74 miles      Phone/Virtual   [address] [place] [address]  Language: English  Hours: Mon - Sun Open 24 Hours   Phone: (842) 971-1340 Email: info@[employer] Website: http://www.UrbanTakeover.org          Housing       Coordinated Entry access point  9  [facility] - Housing Services Distance: 10.25 miles      In-Person   [address] [place] [address]  Language: English  Hours: Mon - Fri 9:00 AM - 5:00 PM  Fees: Free   Phone: (901) 372-5299 Email: housing@[facility].org Website: http://www.[facility].org/housing     10  [facility] - Adult half-way [facility] Distance: 11.18 miles      In-Person   [address] [place] [address]  Language: English  Hours: Mon - Sat 10:00 PM - 5:00 PM  Fees: Free   Phone: (372) 443-4174 Email: info@saintola[employer]isan Website: http://www.saintolaf.org/     Drop-in center or day shelter  11  [facility] Distance: 10.58 miles      In-Person   [address] [place] [address]  Language: English  Hours: Mon - Fri 12:00 PM - 3:00 PM  Fees: Free   Phone: (410) 166-2016 Email: [employer]juice.com Website: http://[employer].org/     12  [facility]  [facility] [place] - [facility] Distance: 10.8 miles      In-Person   [address] [place] [address]  Language: English, Argentine, Cameroonian  Hours: Mon - Sat 7:00 AM - 3:00 PM  Fees: Free, Self Pay   Phone: (536) 918-1844 Email: info@[employer] Website: https://www.Plethora/locations/opportunity-center/     Housing search assistance  13  [employer] - Online housing search assistance Distance: 11.28 miles      Phone/Virtual   [address] [place] [address]  Language: English, Hmong, Argentine, Cameroonian  Hours: Mon - Sun Open 24 Hours   Phone: (916) 932-3761 Email: info@[employer].org Website: http://www.ShomoLive.org/     14  Affordable Housing Online - https://[employer]/ Distance: 11.45 miles      Phone/Virtual   [address] [place] [address]  Language: English  Hours: Mon - Sun Open 24 Hours   Email: info@[employer] Website: https://[employer]     Shelter for individuals  15  [facility] Distance: 10.42 miles      In-Person   [address] [place] [address]  Language: English  Hours: Mon - Sun Open 24 Hours  Fees: Free   Phone: (829) 559-7345 Email: communications@[facility].org Website: https://[facility].org/oursaviourshousing/     16  [facility] [place] - [facility] [place] [facility] [place] Distance: 11.24 miles      In-Person   [address] [place] [address]  Language: English  Hours: Mon - Sun 5:00 PM - 10:00 AM  Fees: Free, Self Pay   Phone: (892) 868-2686 Email: info@[employer] Website: https://www.Holvi.[employer]/locations/higher-ground-shelter/          Important Numbers & Websites       [facility]   [address] [place], [place] [address]way.org  Poison Control   (256) 877-1869 [place]  Suicide and Crisis Lifeline   [address] [place], [place] [address]line.org  Childhelp National Child Abuse Hotline   936.196.8640 Childhelphotline.org  National Sexual Assault Hotline   (549)  645-6026 (HOPE) [employer].org  National Runaway Safeline   (675) 280-3912 (RUNAWAY) [employer].comrunaCahootsy Limited.org  Pregnancy & Postpartum Support [place]   Call/text 954-057-7669 Ppsupportmn.org  Substance Abuse National Helpline ([facility]   432-602-HELP (2053) Findtreatment.gov  Emergency Services   913

## 2023-10-04 LAB
ANION GAP SERPL CALCULATED.3IONS-SCNC: 10 MMOL/L (ref 7–15)
BUN SERPL-MCNC: 13 MG/DL (ref 6–20)
CALCIUM SERPL-MCNC: 9.5 MG/DL (ref 8.6–10)
CHLORIDE SERPL-SCNC: 105 MMOL/L (ref 98–107)
CREAT SERPL-MCNC: 1.07 MG/DL (ref 0.67–1.17)
DEPRECATED HCO3 PLAS-SCNC: 28 MMOL/L (ref 22–29)
EGFRCR SERPLBLD CKD-EPI 2021: >90 ML/MIN/1.73M2
GLUCOSE SERPL-MCNC: 99 MG/DL (ref 70–99)
POTASSIUM SERPL-SCNC: 4.2 MMOL/L (ref 3.4–5.3)
SODIUM SERPL-SCNC: 143 MMOL/L (ref 135–145)

## 2024-03-13 ENCOUNTER — MYC REFILL (OUTPATIENT)
Dept: FAMILY MEDICINE | Facility: CLINIC | Age: 24
End: 2024-03-13
Payer: COMMERCIAL

## 2024-03-13 DIAGNOSIS — B00.9 HSV (HERPES SIMPLEX VIRUS) INFECTION: ICD-10-CM

## 2024-03-13 RX ORDER — VALACYCLOVIR HYDROCHLORIDE 500 MG/1
500 TABLET, FILM COATED ORAL DAILY
Qty: 90 TABLET | Refills: 3 | OUTPATIENT
Start: 2024-03-13

## 2024-12-01 ENCOUNTER — HEALTH MAINTENANCE LETTER (OUTPATIENT)
Age: 24
End: 2024-12-01

## 2025-03-24 ENCOUNTER — MYC REFILL (OUTPATIENT)
Dept: FAMILY MEDICINE | Facility: CLINIC | Age: 25
End: 2025-03-24
Payer: COMMERCIAL

## 2025-03-24 DIAGNOSIS — B00.9 HSV (HERPES SIMPLEX VIRUS) INFECTION: ICD-10-CM

## 2025-03-24 RX ORDER — VALACYCLOVIR HYDROCHLORIDE 500 MG/1
500 TABLET, FILM COATED ORAL DAILY
Qty: 90 TABLET | Refills: 3 | OUTPATIENT
Start: 2025-03-24

## 2025-03-24 NOTE — LETTER
March 26, 2025      Guy Nicole  [address] [facility] [address]        Guy Nicole      We recently received a refill request for your    valACYclovir (VALTREX) 500 MG tablet   .     Our records show you are due for a follow up visit with your provider.     Please call the clinic at [phone] to schedule as the providers are booking out.            Sincerely,        Alexandra Valentine MD    Electronically signed

## 2025-04-29 ENCOUNTER — VIRTUAL VISIT (OUTPATIENT)
Dept: URGENT CARE | Facility: CLINIC | Age: 25
End: 2025-04-29
Payer: COMMERCIAL

## 2025-04-29 DIAGNOSIS — Z76.0 ENCOUNTER FOR MEDICATION REFILL: Primary | ICD-10-CM

## 2025-04-29 DIAGNOSIS — A60.00 GENITAL HERPES SIMPLEX TYPE 2: ICD-10-CM

## 2025-04-29 PROCEDURE — 98001 SYNCH AUDIO-VIDEO NEW LOW 30: CPT

## 2025-04-29 RX ORDER — VALACYCLOVIR HYDROCHLORIDE 500 MG/1
500 TABLET, FILM COATED ORAL 2 TIMES DAILY
Qty: 6 TABLET | Refills: 1 | Status: SHIPPED | OUTPATIENT
Start: 2025-04-29

## 2025-04-29 NOTE — PROGRESS NOTES
Guy is a 25 year old male who presents for a billable video visit.    ASSESSMENT/PLAN:    ICD-10-CM    1. Encounter for medication refill  Z76.0       2. Genital herpes simplex type 2  A60.00           Will refill Valtrex 500 mg twice daily for 3 days with 1 refill.  Future refills should come from primary care provider.  We did discuss the etiology of HSV and safe sexual practices.        Follow up with primary care provider with any problems, questions or concerns or if symptoms worsen or fail to improve. Patient verbalized understanding and is agreeable to plan.     SUBJECTIVE:  Guy Nicole is a 25 year old who presents for medication refill of Valtrex 500 mg twice daily for 3 days related to HSV 2.  He has a history of genital herpes and will usually take the medication at the onset of outbreak.  He reports the last time he had noticeable rash was 2 weeks ago but this has resolved.  He has been unable to access medication due to insurance issues and cost.    Review of Systems  All systems reviewed and negative except per HPI.      OBJECTIVE:  Vitals not done due to this being a virtual visit    GENERAL: alert and no distress  EYES: Eyes grossly normal to inspection.  No discharge or erythema, or obvious scleral/conjunctival abnormalities.  RESP: No audible wheeze, cough, or visible cyanosis.    SKIN: Visible skin clear. No significant rash, abnormal pigmentation or lesions.  PSYCH: Appropriate affect, tone, and pace of words    Video-Visit Details    Type of service:  Video Visit  Video Start Time: 9:07 PM  Video End Time: 9:15 PM    Originating Location (pt. Location): Home    Distant Location (provider location):  [facility]     Platform used for Video Visit: [employer]